# Patient Record
(demographics unavailable — no encounter records)

---

## 2024-10-10 NOTE — ASSESSMENT
[FreeTextEntry1] : 55 year old female, with PMH of gastric adenocarcinoma presents with new onset of profound fatigue and subclinical hypothyroidism. Daughter present for call  Given the extreme fatigue and history of high dose dexamethasone use, want to rule out adrenal insufficiency. Whilst multiple short term courses of high dose glucocorticoids is less likely to cause AI, it is still possible and worthwhile excluding in a patient with active symptoms.  Would want to test cortisol and ACTH prior to commencement of levothyroxine and replacing thyroid hormone in the setting of AI can cause adrenal crisis.  #Subclinical hypothyroidism -C/W levothyroxine to 50mcg daily -Repeat labs in 6 weeks for dose titration (with oncology labs) - due  #Rule out adrenal insufficiency -Early morning AM cortisol and ACTH normal in past (pre immunotherapy)  #History of immunotherapy  -Patient made aware the endocrine adverse effects secondary to immunotherapy can occur up to 12 months post last dose-Main organs that can be involved from an endo perspective are pituitary gland, thyroid and pancreas -Patient aware to continue regular glucose and TFT checks as part of chemo labs  Review in 3 months.

## 2024-10-10 NOTE — HISTORY OF PRESENT ILLNESS
[FreeTextEntry1] : This visit was provided via telehealth using real-time 2-way audio visual technology. The patient, Ange Levy was located at home, [id number] [address] [place] [address], at the time of the visit. The provider, GREGG ANGELA was located at the medical office located at [address] [place] [address] [address] at the time of the visit. The patient, and Provider participated in the telehealth encounter. Verbal consent for telehealth services was given on October 8th, 2024 by the patient.  56 year old female, with PMH of gastric adenocarcinoma presents with new onset of profound fatigue and subclinical hypothyroidism.  #Gastric adenocarcinoma -Diagnosed in 2022 -Robotic distal gastrectomy 10/14/22 -Treatment -> XELOX Dec 22 ->Dexamethasone 12mg 2-3 days of every cycle since Dec 2022 ->Capecitabine/oxaliplatin 1/19/23 (dexamethasone days 1-3 of her cylce) + radiotherapy -completed 9/22/23 ->Localized radiotherapy (gastric bed and LN) 4500cGy - thyroid should not be in treatment field -> Olarparib ceased 6/5/24 ->Pembro commenced March 2024 - completed C7 early July - possibly ceasing due to intolerability  #Subclinical hypothyroidism -Currently taking 25mcg daily of levothyroxine (latest TSH 6.81 early July) -Patient denies any past history of thyroid problems -No family history -Denies past radiation to head and neck area -Reports symptoms of extreme fatigue and weakness, but denies any of the following symptoms suggestive of overt hypothryodisim Weight gain Constipation Cold intolerance Edema *Went through menopause 8 years ago, did not have HRT, denies any history of fragility fractures or osteoporosis  Medications -Famotidine -Vemlidy

## 2024-10-10 NOTE — HISTORY OF PRESENT ILLNESS
[FreeTextEntry1] : This visit was provided via telehealth using real-time 2-way audio visual technology. The patient, Ange Levy was located at home, [address] [address] [place] [address], at the time of the visit. The provider, GREGG ANGELA was located at the medical office located at [address] [place] [address] [address] at the time of the visit. The patient, and Provider participated in the telehealth encounter. Verbal consent for telehealth services was given on October 8th, 2024 by the patient.  56 year old female, with PMH of gastric adenocarcinoma presents with new onset of profound fatigue and subclinical hypothyroidism.  #Gastric adenocarcinoma -Diagnosed in 2022 -Robotic distal gastrectomy 10/14/22 -Treatment -> XELOX Dec 22 ->Dexamethasone 12mg 2-3 days of every cycle since Dec 2022 ->Capecitabine/oxaliplatin 1/19/23 (dexamethasone days 1-3 of her cylce) + radiotherapy -completed 9/22/23 ->Localized radiotherapy (gastric bed and LN) 4500cGy - thyroid should not be in treatment field -> Olarparib ceased 6/5/24 ->Pembro commenced March 2024 - completed C7 early July - possibly ceasing due to intolerability  #Subclinical hypothyroidism -Currently taking 25mcg daily of levothyroxine (latest TSH 6.81 early July) -Patient denies any past history of thyroid problems -No family history -Denies past radiation to head and neck area -Reports symptoms of extreme fatigue and weakness, but denies any of the following symptoms suggestive of overt hypothryodisim Weight gain Constipation Cold intolerance Edema *Went through menopause 8 years ago, did not have HRT, denies any history of fragility fractures or osteoporosis  Medications -Famotidine -Vemlidy